# Patient Record
Sex: MALE | Race: BLACK OR AFRICAN AMERICAN | NOT HISPANIC OR LATINO | URBAN - METROPOLITAN AREA
[De-identification: names, ages, dates, MRNs, and addresses within clinical notes are randomized per-mention and may not be internally consistent; named-entity substitution may affect disease eponyms.]

---

## 2017-07-27 ENCOUNTER — EMERGENCY (EMERGENCY)
Facility: HOSPITAL | Age: 32
LOS: 1 days | Discharge: HOME | End: 2017-07-27
Attending: EMERGENCY MEDICINE
Payer: COMMERCIAL

## 2017-07-27 VITALS
HEART RATE: 77 BPM | DIASTOLIC BLOOD PRESSURE: 89 MMHG | RESPIRATION RATE: 20 BRPM | TEMPERATURE: 98 F | SYSTOLIC BLOOD PRESSURE: 134 MMHG | OXYGEN SATURATION: 98 %

## 2017-07-27 PROCEDURE — 99284 EMERGENCY DEPT VISIT MOD MDM: CPT | Mod: 25

## 2017-07-27 PROCEDURE — 99283 EMERGENCY DEPT VISIT LOW MDM: CPT

## 2017-07-27 PROCEDURE — 73610 X-RAY EXAM OF ANKLE: CPT | Mod: 26,LT

## 2017-07-27 PROCEDURE — 73630 X-RAY EXAM OF FOOT: CPT | Mod: 26,LT

## 2017-07-27 PROCEDURE — 73630 X-RAY EXAM OF FOOT: CPT

## 2017-07-27 PROCEDURE — 73610 X-RAY EXAM OF ANKLE: CPT

## 2017-07-27 NOTE — ED ADULT NURSE NOTE - OBJECTIVE STATEMENT
This 32 male in ED with c/o left ankle injury last night while playing Volleyball. Pt presents with swelling to outer ankle and top of foot. Denies numbness or tingling, positive decrease in ROM due to pain, good pulses and capillary refill.

## 2017-07-27 NOTE — ED PROVIDER NOTE - MUSCULOSKELETAL, MLM
Mild swelling of lateral aspect of L ankle. Tenderness over posterior L lateral malleolus. Tenderness over dorsal surface Cuboid bone. L arch intact. <2 sec cap refill L foot w/ +2 dorsalis pedis pulses. Pain with plantar and dorsiflexion. Mild tenderness over distal achilles. No pain with flexion of knee. No tenderness over knee.

## 2017-07-27 NOTE — ED PROVIDER NOTE - PLAN OF CARE
1) Please return to the ED should you have any new or worsening symptoms, worsening pain, develop worsening swelling or any concerning symptoms  2) Please follow up with orthopaedics - a list of local providers has been provided to you

## 2017-07-27 NOTE — ED PROVIDER NOTE - OBJECTIVE STATEMENT
31 y/o male hx of HTN p/w L ankle injury. Per patient, yesterday during volley pall, inverted L ankle w/ immediate pain. Able to ambulate with assistance off the court. No hx of injury in the past. Pain localized to L ankle. No numbness, paresthesias, N/V/D. Been taking tylenol with mild relief. Declining anything for pain. Alleviate with rest and aggravated w/ weight bearing. Pain 5/10.

## 2017-07-27 NOTE — ED PROVIDER NOTE - CARE PLAN
Principal Discharge DX:	Sprain of left ankle, unspecified ligament, initial encounter  Instructions for follow-up, activity and diet:	1) Please return to the ED should you have any new or worsening symptoms, worsening pain, develop worsening swelling or any concerning symptoms  2) Please follow up with orthopaedics - a list of local providers has been provided to you

## 2017-07-27 NOTE — ED PROVIDER NOTE - MEDICAL DECISION MAKING DETAILS
Mathieu resident: 30 y/o male p/w L ankle pain - inversion injury yesterday during volley ball - immediate pain and swelling w/ difficulty bearing weight - tenderness over cuboid bone and posterior L lateral malleolous - will check XR - patient declined pain control

## 2017-07-27 NOTE — ED PROVIDER NOTE - ATTENDING CONTRIBUTION TO CARE
I have seen and evaluated this patient with the resident.   I agree with the findings  unless other wise stated.  I have made appropriate changes in documentations where needed, After my face to face bedside evaluation, I am further  notin32 y/o male p/w L ankle pain - inversion injury yesterday during volley ball - immediate pain and swelling w/ difficulty bearing weight - tenderness over cuboid bone and posterior L lateral malleolous - will check XR - patient declined pain control with meds -crutch walking air cast splint -Shelby

## 2020-06-05 NOTE — ED PROCEDURE NOTE - CPROC ED POST RADIOGRAPHY1
post procedure radiography not performed [0] : 2) Feeling down, depressed, or hopeless: Not at all (0) [[id number]] : 0

## 2020-06-22 ENCOUNTER — EMERGENCY (EMERGENCY)
Facility: HOSPITAL | Age: 35
LOS: 1 days | Discharge: HOME | End: 2020-06-22
Attending: EMERGENCY MEDICINE | Admitting: EMERGENCY MEDICINE
Payer: COMMERCIAL

## 2020-06-22 VITALS
RESPIRATION RATE: 16 BRPM | SYSTOLIC BLOOD PRESSURE: 133 MMHG | OXYGEN SATURATION: 96 % | TEMPERATURE: 98 F | DIASTOLIC BLOOD PRESSURE: 96 MMHG | HEART RATE: 70 BPM

## 2020-06-22 PROBLEM — I10 ESSENTIAL (PRIMARY) HYPERTENSION: Chronic | Status: ACTIVE | Noted: 2017-07-27

## 2020-06-22 LAB
ALBUMIN SERPL ELPH-MCNC: 4.1 G/DL — SIGNIFICANT CHANGE UP (ref 3.3–5)
ALP SERPL-CCNC: 61 U/L — SIGNIFICANT CHANGE UP (ref 40–120)
ALT FLD-CCNC: 22 U/L — SIGNIFICANT CHANGE UP (ref 4–41)
ANION GAP SERPL CALC-SCNC: 11 MMO/L — SIGNIFICANT CHANGE UP (ref 7–14)
AST SERPL-CCNC: 22 U/L — SIGNIFICANT CHANGE UP (ref 4–40)
BASOPHILS # BLD AUTO: 0.01 K/UL — SIGNIFICANT CHANGE UP (ref 0–0.2)
BASOPHILS NFR BLD AUTO: 0.2 % — SIGNIFICANT CHANGE UP (ref 0–2)
BILIRUB SERPL-MCNC: 0.5 MG/DL — SIGNIFICANT CHANGE UP (ref 0.2–1.2)
BUN SERPL-MCNC: 13 MG/DL — SIGNIFICANT CHANGE UP (ref 7–23)
CALCIUM SERPL-MCNC: 9.4 MG/DL — SIGNIFICANT CHANGE UP (ref 8.4–10.5)
CHLORIDE SERPL-SCNC: 103 MMOL/L — SIGNIFICANT CHANGE UP (ref 98–107)
CO2 SERPL-SCNC: 25 MMOL/L — SIGNIFICANT CHANGE UP (ref 22–31)
CREAT SERPL-MCNC: 1.12 MG/DL — SIGNIFICANT CHANGE UP (ref 0.5–1.3)
D DIMER BLD IA.RAPID-MCNC: 174 NG/ML — SIGNIFICANT CHANGE UP
EOSINOPHIL # BLD AUTO: 0.12 K/UL — SIGNIFICANT CHANGE UP (ref 0–0.5)
EOSINOPHIL NFR BLD AUTO: 1.8 % — SIGNIFICANT CHANGE UP (ref 0–6)
GLUCOSE SERPL-MCNC: 103 MG/DL — HIGH (ref 70–99)
HCT VFR BLD CALC: 46.7 % — SIGNIFICANT CHANGE UP (ref 39–50)
HGB BLD-MCNC: 16.1 G/DL — SIGNIFICANT CHANGE UP (ref 13–17)
IMM GRANULOCYTES NFR BLD AUTO: 0.3 % — SIGNIFICANT CHANGE UP (ref 0–1.5)
LYMPHOCYTES # BLD AUTO: 2.06 K/UL — SIGNIFICANT CHANGE UP (ref 1–3.3)
LYMPHOCYTES # BLD AUTO: 31.2 % — SIGNIFICANT CHANGE UP (ref 13–44)
MCHC RBC-ENTMCNC: 30.8 PG — SIGNIFICANT CHANGE UP (ref 27–34)
MCHC RBC-ENTMCNC: 34.5 % — SIGNIFICANT CHANGE UP (ref 32–36)
MCV RBC AUTO: 89.5 FL — SIGNIFICANT CHANGE UP (ref 80–100)
MONOCYTES # BLD AUTO: 0.46 K/UL — SIGNIFICANT CHANGE UP (ref 0–0.9)
MONOCYTES NFR BLD AUTO: 7 % — SIGNIFICANT CHANGE UP (ref 2–14)
NEUTROPHILS # BLD AUTO: 3.94 K/UL — SIGNIFICANT CHANGE UP (ref 1.8–7.4)
NEUTROPHILS NFR BLD AUTO: 59.5 % — SIGNIFICANT CHANGE UP (ref 43–77)
NRBC # FLD: 0 K/UL — SIGNIFICANT CHANGE UP (ref 0–0)
PLATELET # BLD AUTO: 292 K/UL — SIGNIFICANT CHANGE UP (ref 150–400)
PMV BLD: 10.1 FL — SIGNIFICANT CHANGE UP (ref 7–13)
POTASSIUM SERPL-MCNC: 3.9 MMOL/L — SIGNIFICANT CHANGE UP (ref 3.5–5.3)
POTASSIUM SERPL-SCNC: 3.9 MMOL/L — SIGNIFICANT CHANGE UP (ref 3.5–5.3)
PROT SERPL-MCNC: 7.3 G/DL — SIGNIFICANT CHANGE UP (ref 6–8.3)
RBC # BLD: 5.22 M/UL — SIGNIFICANT CHANGE UP (ref 4.2–5.8)
RBC # FLD: 11.9 % — SIGNIFICANT CHANGE UP (ref 10.3–14.5)
SODIUM SERPL-SCNC: 139 MMOL/L — SIGNIFICANT CHANGE UP (ref 135–145)
TROPONIN T, HIGH SENSITIVITY: < 6 NG/L — SIGNIFICANT CHANGE UP (ref ?–14)
WBC # BLD: 6.61 K/UL — SIGNIFICANT CHANGE UP (ref 3.8–10.5)
WBC # FLD AUTO: 6.61 K/UL — SIGNIFICANT CHANGE UP (ref 3.8–10.5)

## 2020-06-22 PROCEDURE — 93010 ELECTROCARDIOGRAM REPORT: CPT | Mod: 59

## 2020-06-22 PROCEDURE — 99283 EMERGENCY DEPT VISIT LOW MDM: CPT | Mod: 25

## 2020-06-22 RX ORDER — CYCLOBENZAPRINE HYDROCHLORIDE 10 MG/1
1 TABLET, FILM COATED ORAL
Qty: 10 | Refills: 0
Start: 2020-06-22

## 2020-06-22 RX ORDER — LIDOCAINE 4 G/100G
1 CREAM TOPICAL ONCE
Refills: 0 | Status: COMPLETED | OUTPATIENT
Start: 2020-06-22 | End: 2020-06-22

## 2020-06-22 RX ADMIN — Medication 30 MILLILITER(S): at 15:11

## 2020-06-22 RX ADMIN — LIDOCAINE 1 PATCH: 4 CREAM TOPICAL at 16:18

## 2020-06-22 NOTE — ED PROVIDER NOTE - PATIENT PORTAL LINK FT
You can access the FollowMyHealth Patient Portal offered by [facility] by registering at the following website: [email or web address]. By joining [facility]’s FollowMyHealth portal, you will also be able to view your health information using other applications (apps) compatible with our system.

## 2020-06-22 NOTE — ED PROVIDER NOTE - CLINICAL SUMMARY MEDICAL DECISION MAKING FREE TEXT BOX
35y male with chest discomfort. Constant with no relieving or exacerbating factors. PERC negative. Equal pulses vitals wnl, dissection unlikely. ACS vs sk or GI cause. Will get ekg, cbc, cmp, trop. CXR on disc from outside facility from today.

## 2020-06-22 NOTE — ED PROVIDER NOTE - PROGRESS NOTE DETAILS
Manuel Milner, PGY-2: patient feeling well still having some discomfort in the back. Negative trop, dimer less than 230, Has follow up with his PCP. Hayden diaz.

## 2020-06-22 NOTE — ED PROVIDER NOTE - NSFOLLOWUPINSTRUCTIONS_ED_ALL_ED_FT
Follow up with your primary doctor in the next 2-3 days.     Take all medications as prescribed.     Do not take Cyclobenzaprine if you are going to drive.     Seek immediate medical attention if you have new or worsening symptoms.

## 2020-06-22 NOTE — ED PROVIDER NOTE - OBJECTIVE STATEMENT
35y male with chest discomfort. Started 2 days ago, feels pressure like, not better or worse with exertion, eating, movement or deep breaths. States it feels like when you swallow a large bite and it causes discomfort when swallowed. Felt nauseous for an hour yesterday. Radiates to the upper back midline. No SOB, cough, vomiting, abdominal pain. No calf pain or swelling, no smoking, only takes norvasc daily, no recent travel or surgeries, no cancer history.

## 2020-06-22 NOTE — ED PROVIDER NOTE - ATTENDING CONTRIBUTION TO CARE
Patient is a 34 yo M with history HTN sent in by urgent care for evaluation of chest discomfort x 2 days. Patient states it feels like when you swallow a large bite and you have discomfort. Denies pleuritic pain, shortness of breath, leg swelling. No recent travel. Denies fevers, chills, vomiting. No abdominal pain. He has a discomfort in his left upper back in a focal area. Denies injury. He exercises on a regular basis, reports no exertional chest pain, no shortness of breath. No history of DVT/ PE. He does not recall any injury.    VS noted  Gen. no acute distress, Non toxic   HEENT: EOMI, mmm  Spine: No C-T spine tenderness, + palpable knot in left parathoracic area, c/w patient's discomfort.   Lungs: CTAB/L no C/ W /R   CVS: RRR   Abd; Soft non tender, non distended   Ext: no edema  Skin: no rash  Neuro AAOx3 non focal clear speech  a/p: chest discomfort, focal back discomfort - PERC negative, Low risk for ACS. EKG wnl. Plan for trop, d-dimer, pain control. Patient drove here and if no acute findings, likely treat for MSK pain.   - Chema MCKINNON Patient is a 34 yo M with history HTN sent in by urgent care for evaluation of chest discomfort x 2 days. Patient states it feels like when you swallow a large bite and you have discomfort. Denies pleuritic pain, shortness of breath, leg swelling. No recent travel. Denies fevers, chills, vomiting. No abdominal pain. He has a discomfort in his left upper back in a focal area. Denies injury. He exercises on a regular basis, reports no exertional chest pain, no shortness of breath. No history of DVT/ PE. He does not recall any injury. CXR at urgent care was negative. Patient has report with him.     VS noted  Gen. no acute distress, Non toxic   HEENT: EOMI, mmm  Spine: No C-T spine tenderness, + palpable knot in left parathoracic area, c/w patient's discomfort.   Lungs: CTAB/L no C/ W /R   CVS: RRR   Abd; Soft non tender, non distended   Ext: no edema  Skin: no rash  Neuro AAOx3 non focal clear speech  a/p: chest discomfort, focal back discomfort - PERC negative, Low risk for ACS/PE. EKG wnl. Plan for trop, d-dimer, pain control. Patient drove here and if no acute findings, likely treat for MSK pain.   - Chema MCKINONN

## 2021-06-28 NOTE — ED PROVIDER NOTE - NSCAREINITIATED _GEN_ER
[No Acute Distress] : no acute distress [PERRL] : pupils equal round and reactive to light [Normal TMs] : both tympanic membranes were normal [Supple] : supple [Clear to Auscultation] : lungs were clear to auscultation bilaterally [Regular Rhythm] : with a regular rhythm [No Edema] : there was no peripheral edema [Soft] : abdomen soft [No HSM] : no HSM [Normal] : soft, non-tender, non-distended, no masses palpated, no HSM and normal bowel sounds [Normal Supraclavicular Nodes] : no supraclavicular lymphadenopathy [Normal Posterior Cervical Nodes] : no posterior cervical lymphadenopathy [Normal Anterior Cervical Nodes] : no anterior cervical lymphadenopathy [No CVA Tenderness] : no CVA  tenderness [No Joint Swelling] : no joint swelling [No Rash] : no rash Manuel Milner(Resident) [Normal Gait] : normal gait [Normal Insight/Judgement] : insight and judgment were intact